# Patient Record
(demographics unavailable — no encounter records)

---

## 2025-02-26 NOTE — ASSESSMENT
[FreeTextEntry1] : Impression:  #1 colon cancer screening- the patient is currently asymptomatic from a GI standpoint.  Patient average risk.    #2 hypertension.  #3 hypercholesterolemia.  #4 CAD status post MI-status post coronary stent insertion (RCA) x 1 10/2023.  #5 obesity-BMI 30.36.  #6 status post  section.  #7 history of Bell's palsy.  #8 status post bilateral THR.

## 2025-02-26 NOTE — PHYSICAL EXAM
[Alert] : alert [Normal Voice/Communication] : normal voice/communication [No Acute Distress] : no acute distress [Sclera] : the sclera and conjunctiva were normal [Normal Appearance] : the appearance of the neck was normal [No Respiratory Distress] : no respiratory distress [No Acc Muscle Use] : no accessory muscle use [Respiration, Rhythm And Depth] : normal respiratory rhythm and effort [Auscultation Breath Sounds / Voice Sounds] : lungs were clear to auscultation bilaterally [Heart Rate And Rhythm] : heart rate was normal and rhythm regular [Normal S1, S2] : normal S1 and S2 [Murmurs] : no murmurs [Abdomen Tenderness] : non-tender [No Masses] : no abdominal mass palpated [Abdomen Soft] : soft [] : no hepatosplenomegaly [Abnormal Walk] : normal gait [No Clubbing, Cyanosis] : no clubbing or cyanosis of the fingernails [Normal Color / Pigmentation] : normal skin color and pigmentation [Oriented To Time, Place, And Person] : oriented to person, place, and time [Normal Affect] : the affect was normal [Normal Insight/Judgment] : insight and judgment were intact [Normal Mood] : the mood was normal [Obese (BMI >= 30)] : obese (BMI >= 30)

## 2025-02-26 NOTE — ADDENDUM
[FreeTextEntry1] : Plan:  #1 the patient will be scheduled for a screening colonoscopy.  Risks, benefits and alternatives were discussed possibly including bleeding, perforation, need for blood transfusion or surgery, infection and medication and anesthetic risk.  Limitations of colonoscopy have been discussed including missed polyps and cancer.  Possible medication and anesthesia related adverse cardiovascular and respiratory reactions have been discussed.  The patient will utilize the Clenpiq colonoscopy preparation in split fashion.  The patient will confer with the prescribing physician regarding Effient administration and if cleared and deemed safe will hold Effient for 1 week before the colonoscopy.  #2 continue pantoprazole 40 mg daily which provide prophylactic antisecretory treatment in view of ongoing maintenance aspirin and Effient.

## 2025-02-26 NOTE — HISTORY OF PRESENT ILLNESS
[FreeTextEntry1] : Office revisit on 2/26/2025.    Patient presents for evaluation regarding timing of screening colonoscopy.  Previously evaluated for office consultation on 4/22/16.  CONSULTATION NOTE (4/22/2016):  The patient is a 64-year-old woman who is evaluated in office consultation in preparation for a screening colonoscopy.  The patient is asymptomatic from a GI standpoint at the current time. She has one formed bowel movement daily without any complaints of diarrhea, constipation, change in bowel habit or rectal bleeding. She denies any dysphagia, nausea, vomiting or abdominal pain.  The patient infrequently can experience heartburn in association with culprit foods. She may take pantoprazole 40 mg once a month on an on demand basis.  The patient experienced an isolated self-limited episode of hematochezia one year ago. This has not recurred.  The patient reports having had a normal colonoscopy at age 50. She reports no history of digestive illness.  CURRENT HISTORY:  COLONOSCOPY 8/25/2016:     -Screening colonoscopy was performed on 8/25/2016. Total colonoscopy was performed to the cecum with ileoscopy. A 4 mm splenic flexure hyperplastic polyp was removed. No adenomatous features, dysplasia or report as a sessile serrated polyp. Mild sigmoid diverticulosis was noted. Small hemorrhoids were detected. Followup screening colonoscopy is advised in 10 years.   ORV 5/15/2024:     -Evaluated regarding screening colonoscopy.  Significant cardiac comorbidity since last examination reviewed with patient.  Status post MI 10/2023 with insertion of coronary stent (RCA) X 1.  Uneventful clinical course.  Recent normal exercise stress test.  However, currently on regimen of Effient 5 mg daily and aspirin 81 mg daily.  Indicates feeling well at current time.  Asymptomatic from GI standpoint.  Appetite stable and indicates intentional 25-pound weight loss.  Denies complaints of dysphagia, heartburn, nausea, vomiting or abdominal pain.  Has regular bowel movements without any complaints of diarrhea, constipation, change in bowel habit or rectal bleeding.                                -Medications: Effient 5 mg, aspirin 81 mg, Crestor 40 mg, Zetia 10 mg, metformin 1000 mg hydrochlorothiazide 25 mg.  ORV 2/26/2025:     -Presents in preparation for screening colonoscopy.  Reports feeling well from GI standpoint.  Typically has formed bowel movements 1-2 times daily without any complaints of diarrhea, constipation, change in bowel habit or rectal bleeding.  Appetite and weight stable.  Denies complaints of dysphagia, heartburn, nausea, vomiting or abdominal pain.                               -Stable from cardiac standpoint.  Indicates last exercise stress test 4/2024 with plan for repeat in 4/2025.  Followed by Dr. Stephen Goldberg of cardiology as well as Dr. Amos Wang of interventional cardiology.  At last echocardiogram had normal EF of 65%.  Patient is status post coronary stent x 1.                               -Medications: Effient 5 mg, aspirin 81 mg, metformin 1000 mg, HCTZ 25 mg, Crestor 40 mg, Zetia 10 mg, pantoprazole 40 mg.

## 2025-02-26 NOTE — REASON FOR VISIT
[Follow-up] : a follow-up of an existing diagnosis [FreeTextEntry1] : in preparation for screening colonoscopy.